# Patient Record
Sex: FEMALE | Race: WHITE | URBAN - METROPOLITAN AREA
[De-identification: names, ages, dates, MRNs, and addresses within clinical notes are randomized per-mention and may not be internally consistent; named-entity substitution may affect disease eponyms.]

---

## 2019-11-27 LAB
INFLUENZA A ANTIGEN, POC: NEGATIVE
INFLUENZA B ANTIGEN, POC: NEGATIVE
RAPID GROUP A STREP (OHS): NEGATIVE

## 2022-04-10 ENCOUNTER — HISTORICAL (OUTPATIENT)
Dept: ADMINISTRATIVE | Facility: HOSPITAL | Age: 37
End: 2022-04-10

## 2022-04-26 VITALS
DIASTOLIC BLOOD PRESSURE: 73 MMHG | WEIGHT: 177.25 LBS | SYSTOLIC BLOOD PRESSURE: 109 MMHG | BODY MASS INDEX: 30.26 KG/M2 | OXYGEN SATURATION: 100 % | HEIGHT: 64 IN

## 2022-09-16 ENCOUNTER — HISTORICAL (OUTPATIENT)
Dept: ADMINISTRATIVE | Facility: HOSPITAL | Age: 37
End: 2022-09-16

## 2025-03-16 ENCOUNTER — OFFICE VISIT (OUTPATIENT)
Dept: URGENT CARE | Facility: CLINIC | Age: 40
End: 2025-03-16
Payer: MEDICAID

## 2025-03-16 VITALS
BODY MASS INDEX: 33.29 KG/M2 | HEIGHT: 64 IN | WEIGHT: 195 LBS | RESPIRATION RATE: 20 BRPM | SYSTOLIC BLOOD PRESSURE: 111 MMHG | OXYGEN SATURATION: 100 % | TEMPERATURE: 98 F | DIASTOLIC BLOOD PRESSURE: 74 MMHG | HEART RATE: 69 BPM

## 2025-03-16 DIAGNOSIS — M25.519 SHOULDER PAIN, UNSPECIFIED CHRONICITY, UNSPECIFIED LATERALITY: Primary | ICD-10-CM

## 2025-03-16 PROCEDURE — 99213 OFFICE O/P EST LOW 20 MIN: CPT | Mod: ,,, | Performed by: CLINIC/CENTER

## 2025-03-16 RX ORDER — THYROID, PORCINE 30 MG/1
30 TABLET ORAL
COMMUNITY
Start: 2025-03-09

## 2025-03-16 RX ORDER — NAPROXEN 500 MG/1
500 TABLET ORAL EVERY 12 HOURS PRN
Qty: 14 TABLET | Refills: 0 | Status: SHIPPED | OUTPATIENT
Start: 2025-03-16

## 2025-03-16 NOTE — PROGRESS NOTES
"Subjective:      Patient ID: Oneida Gray is a 39 y.o. female.    Vitals:  height is 5' 4" (1.626 m) and weight is 88.5 kg (195 lb). Her tympanic temperature is 97.6 °F (36.4 °C). Her blood pressure is 111/74 and her pulse is 69. Her respiration is 20 and oxygen saturation is 100%.     Chief Complaint: Shoulder Pain     Patient is a 39 y.o. female who presents to urgent care with complaints of shoulder pain right side onset 1 week.  Patient reports that the pain has been going on 6 months intermittently states that it got worse after her young son jumped onto her shoulder Alleviating factors include advil with no relief. Patient denies N/V/D HA BA.  Patient reports plain with Flexion of the shoulder, shoulder abduction, shoulder internal rotation shoulder external rotation        Musculoskeletal:  Positive for joint pain.      Objective:     Physical Exam   Constitutional: She is oriented to person, place, and time. She appears well-developed. She is cooperative.   HENT:   Head: Normocephalic and atraumatic.   Ears:   Right Ear: Hearing, tympanic membrane, external ear and ear canal normal.   Left Ear: Hearing, tympanic membrane, external ear and ear canal normal.   Nose: Nose normal. No mucosal edema or nasal deformity. No epistaxis. Right sinus exhibits no maxillary sinus tenderness and no frontal sinus tenderness. Left sinus exhibits no maxillary sinus tenderness and no frontal sinus tenderness.   Mouth/Throat: Uvula is midline, oropharynx is clear and moist and mucous membranes are normal. No trismus in the jaw. Normal dentition. No uvula swelling.   Eyes: Conjunctivae and lids are normal.   Neck: Trachea normal and phonation normal. Neck supple.   Cardiovascular: Normal rate, regular rhythm, normal heart sounds and normal pulses.   Pulmonary/Chest: Effort normal and breath sounds normal.   Abdominal: Normal appearance and bowel sounds are normal. Soft.   Musculoskeletal: Normal range of motion.         " "General: No swelling, tenderness or deformity. Normal range of motion.      Right lower leg: No edema.      Left lower leg: No edema.      Comments: Patient reports plain with Flexion of the shoulder, shoulder abduction, shoulder internal rotation shoulder external rotation   Neurological: She is alert and oriented to person, place, and time. She exhibits normal muscle tone.   Skin: Skin is warm, dry and intact.   Psychiatric: Her speech is normal and behavior is normal. Judgment and thought content normal.   Nursing note and vitals reviewed.         Previous History      Review of patient's allergies indicates:  No Known Allergies    Past Medical History:   Diagnosis Date    Hyperthyroidism      Current Outpatient Medications   Medication Instructions    ARMOUR THYROID 30 mg    naproxen (NAPROSYN) 500 mg, Oral, Every 12 hours PRN     History reviewed. No pertinent surgical history.  No family history on file.    Social History[1]     Physical Exam      Vital Signs Reviewed   /74   Pulse 69   Temp 97.6 °F (36.4 °C) (Tympanic)   Resp 20   Ht 5' 4" (1.626 m)   Wt 88.5 kg (195 lb)   LMP 03/15/2025   SpO2 100%   BMI 33.47 kg/m²        Procedures    Procedures     Labs     Results for orders placed or performed in visit on 09/16/22   POCT Influenza A/B Molecular    Collection Time: 11/27/19 12:26 PM   Result Value Ref Range    Inflenza A Ag Negative     Influenza B Ag Negative    POCT rapid strep A    Collection Time: 11/27/19 12:27 PM   Result Value Ref Range    Rapid Group A Strep Negative       Assessment:     1. Shoulder pain, unspecified chronicity, unspecified laterality      Shoulder xray read by radiologist shows FINDINGS:  Two views of the right shoulder.  There is no fracture or dislocation.  Question calcific tendinopathy.     Impression:     No acute findings  Plan:   Ice the shoulder 5 times a day for 15 minutes at a time.  Take anti-inflammatory with food.  Do not take if you are pregnant.  " Follow up with your primary care provider in 1 week for further evaluation of this problem.    Shoulder pain, unspecified chronicity, unspecified laterality  -     XR SHOULDER COMPLETE 2 OR MORE VIEWS RIGHT; Future; Expected date: 03/16/2025  -     naproxen (NAPROSYN) 500 MG tablet; Take 1 tablet (500 mg total) by mouth every 12 (twelve) hours as needed (pain).  Dispense: 14 tablet; Refill: 0                         [1]   Social History  Tobacco Use    Smoking status: Never    Smokeless tobacco: Never

## 2025-03-16 NOTE — PATIENT INSTRUCTIONS
Ice the shoulder 5 times a day for 15 minutes at a time.  Take anti-inflammatory with food.  Do not take if you are pregnant.  Follow up with your primary care provider in 1 week for further evaluation of this problem.